# Patient Record
Sex: MALE | Race: WHITE | NOT HISPANIC OR LATINO | Employment: OTHER | URBAN - METROPOLITAN AREA
[De-identification: names, ages, dates, MRNs, and addresses within clinical notes are randomized per-mention and may not be internally consistent; named-entity substitution may affect disease eponyms.]

---

## 2018-10-29 ENCOUNTER — TELEPHONE (OUTPATIENT)
Dept: SURGERY | Facility: CLINIC | Age: 78
End: 2018-10-29

## 2018-10-29 NOTE — TELEPHONE ENCOUNTER
Spoke with Miladis and informed her that she should call Medical Records for the information from 2009 that she is requesting. Number given and she understands.

## 2018-10-29 NOTE — TELEPHONE ENCOUNTER
----- Message from Dinorah Breaux sent at 10/29/2018  1:01 PM CDT -----  Contact: Miladis Kang (Spouse)[phone]  Needs Advice    Reason for call:She wants to know what type of mesh was used for the colon removal         Communication Preference:call    Additional Information: